# Patient Record
Sex: MALE | Race: WHITE
[De-identification: names, ages, dates, MRNs, and addresses within clinical notes are randomized per-mention and may not be internally consistent; named-entity substitution may affect disease eponyms.]

---

## 2017-05-22 ENCOUNTER — HOSPITAL ENCOUNTER (EMERGENCY)
Dept: HOSPITAL 53 - M ED | Age: 22
Discharge: HOME | End: 2017-05-22
Payer: OTHER GOVERNMENT

## 2017-05-22 VITALS — SYSTOLIC BLOOD PRESSURE: 125 MMHG | DIASTOLIC BLOOD PRESSURE: 78 MMHG

## 2017-05-22 VITALS — HEIGHT: 72 IN | BODY MASS INDEX: 27.09 KG/M2 | WEIGHT: 200 LBS

## 2017-05-22 DIAGNOSIS — G89.29: ICD-10-CM

## 2017-05-22 DIAGNOSIS — F17.200: ICD-10-CM

## 2017-05-22 DIAGNOSIS — R07.9: Primary | ICD-10-CM

## 2017-05-22 DIAGNOSIS — M54.9: ICD-10-CM

## 2017-05-22 PROCEDURE — 93005 ELECTROCARDIOGRAM TRACING: CPT

## 2017-05-22 PROCEDURE — 36415 COLL VENOUS BLD VENIPUNCTURE: CPT

## 2017-05-22 PROCEDURE — 71020: CPT

## 2017-05-22 PROCEDURE — 85379 FIBRIN DEGRADATION QUANT: CPT

## 2017-05-22 PROCEDURE — 96374 THER/PROPH/DIAG INJ IV PUSH: CPT

## 2017-05-22 PROCEDURE — 99284 EMERGENCY DEPT VISIT MOD MDM: CPT

## 2017-05-22 NOTE — REP
Clinical:  Chest pain .

 

Comparison: None .

 

Technique:  PA and lateral.

 

Findings:

The mediastinum and cardiac silhouette are normal.  The lung fields are clear and

without acute consolidation, effusion, or pneumothorax.  The skeletal structures

are intact and normal.

 

Impression:

1.   No acute cardiopulmonary process.

 

 

Signed by

Brad Graham MD 05/22/2017 01:00 P

## 2017-05-22 NOTE — ECGEPIP
Stationary ECG Study

                           [facility] - ED

                                       

                                       Test Date:    2017

Pat Name:     GREG BRADY            Department:   

Patient ID:   [id number]                 Room:         -

Gender:       M                        Technician:   paulino

:          1995               Requested By: Oumou Norton 

Order Number: [id number]     Reading MD:   Sudeep David

                                 Measurements

Intervals                              Axis          

Rate:         66                       P:            41

MD:           157                      QRS:          55

QRSD:         98                       T:            5

QT:           366                                    

QTc:          384                                    

                           Interpretive Statements

SINUS RHYTHM

EARLY REPOLARIZATION

NO PRIORS

Electronically Signed On 2017 15:02:58 EDT by Sudeep David

## 2018-02-11 ENCOUNTER — HOSPITAL ENCOUNTER (EMERGENCY)
Dept: HOSPITAL 53 - M ED | Age: 23
Discharge: HOME | End: 2018-02-11
Payer: COMMERCIAL
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
## 2018-02-11 DIAGNOSIS — F17.200: ICD-10-CM

## 2018-02-11 DIAGNOSIS — S62.231A: ICD-10-CM
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
## 2018-02-11 DIAGNOSIS — S60.221A: Primary | ICD-10-CM

## 2018-02-11 DIAGNOSIS — T76.21XA: Primary | ICD-10-CM

## 2018-02-11 PROCEDURE — 99282 EMERGENCY DEPT VISIT SF MDM: CPT

## 2018-02-11 PROCEDURE — 73130 X-RAY EXAM OF HAND: CPT
